# Patient Record
Sex: FEMALE | Race: WHITE
[De-identification: names, ages, dates, MRNs, and addresses within clinical notes are randomized per-mention and may not be internally consistent; named-entity substitution may affect disease eponyms.]

---

## 2018-07-07 ENCOUNTER — HOSPITAL ENCOUNTER (EMERGENCY)
Dept: HOSPITAL 26 - MED | Age: 23
Discharge: HOME | End: 2018-07-07
Payer: COMMERCIAL

## 2018-07-07 VITALS — WEIGHT: 163 LBS | HEIGHT: 66 IN | BODY MASS INDEX: 26.2 KG/M2

## 2018-07-07 VITALS — DIASTOLIC BLOOD PRESSURE: 95 MMHG | SYSTOLIC BLOOD PRESSURE: 138 MMHG

## 2018-07-07 VITALS — DIASTOLIC BLOOD PRESSURE: 75 MMHG | SYSTOLIC BLOOD PRESSURE: 133 MMHG

## 2018-07-07 DIAGNOSIS — R51: ICD-10-CM

## 2018-07-07 DIAGNOSIS — N39.0: Primary | ICD-10-CM

## 2018-07-07 DIAGNOSIS — Z91.041: ICD-10-CM

## 2018-07-07 PROCEDURE — 96375 TX/PRO/DX INJ NEW DRUG ADDON: CPT

## 2018-07-07 PROCEDURE — 99284 EMERGENCY DEPT VISIT MOD MDM: CPT

## 2018-07-07 PROCEDURE — 96374 THER/PROPH/DIAG INJ IV PUSH: CPT

## 2018-07-07 PROCEDURE — 81025 URINE PREGNANCY TEST: CPT

## 2018-07-07 PROCEDURE — 81002 URINALYSIS NONAUTO W/O SCOPE: CPT

## 2018-07-07 PROCEDURE — 96361 HYDRATE IV INFUSION ADD-ON: CPT

## 2018-07-07 NOTE — NUR
Patient discharged with v/s stable. Written and verbal after care instructions 
given and explained. Patient alert, oriented and verbalized understanding of 
instructions. Ambulatory with steady gait. All questions addressed prior to 
discharge. ID band removed. Patient advised to follow up with PMD. Rx of Cipro 
given. Patient educated on indication of medication including possible reaction 
and side effects. Opportunity to ask questions provided and answered.

## 2018-07-07 NOTE — NUR
PATIENT PRESENTS TO ED WITH CONTINUOUS HA X5 DAYS. PT STATES SHE DOES NOT HAVE 
HX OF MIGRAINS, NO TRAUMA, AND HAS BEEN TREATING HA WITH PO FLUIDS AND TYLENOL 
WITHOUT RELIEF OF PAIN. PAIN 8/10. DENIES N/V/D; SKIN IS PINK/WARM/DRY; AAOX4 
WITH EVEN AND STEADY GAIT; LUNGS CLEAR BL; HR EVEN AND REGULAR; PT DENIES ANY 
FEVER, CP, SOB, OR COUGH AT THIS TIME; VSS; PATIENT POSITIONED FOR COMFORT; HOB 
ELEVATED; BEDRAILS UP X2; BED DOWN. ER MD MADE AWARE OF PT STATUS. CONTINUE TO 
MONITOR.

## 2019-08-22 ENCOUNTER — HOSPITAL ENCOUNTER (EMERGENCY)
Dept: HOSPITAL 26 - MED | Age: 24
Discharge: HOME | End: 2019-08-22
Payer: COMMERCIAL

## 2019-08-22 VITALS — HEIGHT: 66 IN | BODY MASS INDEX: 27.97 KG/M2 | WEIGHT: 174 LBS

## 2019-08-22 VITALS — DIASTOLIC BLOOD PRESSURE: 70 MMHG | SYSTOLIC BLOOD PRESSURE: 134 MMHG

## 2019-08-22 VITALS — DIASTOLIC BLOOD PRESSURE: 65 MMHG | SYSTOLIC BLOOD PRESSURE: 122 MMHG
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
## 2019-08-22 DIAGNOSIS — T19.2XXA: Primary | ICD-10-CM
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
## 2019-08-22 DIAGNOSIS — Z88.8: ICD-10-CM

## 2019-08-22 NOTE — NUR
PATIENT PRESENTS TO ED WITH TAMPON LODGED IN VAGINAL CANAL X1 HOUR. PT 
ATTEMPTED TO RETRIEVE TAMPON AND UNSUCCESSFUL. 



ALLERGY;IODINE 

NO PMH . DENIES N/V/D; SKIN IS PINK/WARM/DRY; AAOX4 WITH EVEN AND STEADY GAIT; 
LUNGS CLEAR BL; HR EVEN AND REGULAR; PT DENIES ANY FEVER, CP, SOB, OR COUGH AT 
THIS TIME; PATIENT STATES PAIN OF 2/10 AT THIS TIME; VSS; PATIENT POSITIONED 
FOR COMFORT; HOB ELEVATED; BEDRAILS UP X2; BED DOWN. ER MD MADE AWARE OF PT 
STATUS.

## 2019-12-28 ENCOUNTER — HOSPITAL ENCOUNTER (EMERGENCY)
Dept: HOSPITAL 26 - MED | Age: 24
Discharge: HOME | End: 2019-12-28
Payer: COMMERCIAL

## 2019-12-28 VITALS — HEIGHT: 66 IN | WEIGHT: 182 LBS | BODY MASS INDEX: 29.25 KG/M2

## 2019-12-28 VITALS — DIASTOLIC BLOOD PRESSURE: 71 MMHG | SYSTOLIC BLOOD PRESSURE: 122 MMHG

## 2019-12-28 DIAGNOSIS — J11.1: Primary | ICD-10-CM

## 2019-12-28 DIAGNOSIS — Z91.048: ICD-10-CM

## 2019-12-28 NOTE — NUR
Patient discharged with v/s stable. Written and verbal after care instructions 
given and explained. 

Patient alert, oriented and verbalized understanding of instructions. 
Ambulatory with steady gait. All questions addressed prior to discharge. ID 
band removed. Patient advised to follow up with PMD. Rx of PROMETHAZINE AND 
TAMIFLU given. Patient educated on indication of medication including possible 
reaction and side effects. Opportunity to ask questions provided and answered.

## 2019-12-28 NOTE — NUR
25 Y/O FEMALE C/O COUGH, CONGESTION, FEVER AND BODY ACHES X 6 DAYS. PT STATES 
SHE HAS BEEN TAKING MOTRIN AND TYLENOL TO CONTROL FEVER. HEADACHE PROVOKED BY 
COUGH. DENIES N/V/D. RR EVEN AND UNLABORED. DRY, NON PRODUCTIVE COUGH NOTED. PT 
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 LMP 
10/27/19 PER PT. PT SITTING UPRIGHT IN BED, POSITIONED FOR COMFORT. 





MEDHX: DENIES 

ALLERGIES: IODINE

## 2023-05-26 NOTE — NUR
PT AMBULATED TO ER BED 2. Olanzapine Counseling- I discussed with the patient the common side effects of olanzapine including but are not limited to: lack of energy, dry mouth, increased appetite, sleepiness, tremor, constipation, dizziness, changes in behavior, or restlessness.  Explained that teenagers are more likely to experience headaches, abdominal pain, pain in the arms or legs, tiredness, and sleepiness.  Serious side effects include but are not limited: increased risk of death in elderly patients who are confused, have memory loss, or dementia-related psychosis; hyperglycemia; increased cholesterol and triglycerides; and weight gain.